# Patient Record
Sex: MALE | Race: OTHER | NOT HISPANIC OR LATINO
[De-identification: names, ages, dates, MRNs, and addresses within clinical notes are randomized per-mention and may not be internally consistent; named-entity substitution may affect disease eponyms.]

---

## 2022-12-19 ENCOUNTER — APPOINTMENT (OUTPATIENT)
Dept: UROLOGY | Facility: CLINIC | Age: 22
End: 2022-12-19

## 2022-12-20 ENCOUNTER — APPOINTMENT (OUTPATIENT)
Dept: UROLOGY | Facility: CLINIC | Age: 22
End: 2022-12-20
Payer: COMMERCIAL

## 2022-12-20 VITALS
SYSTOLIC BLOOD PRESSURE: 113 MMHG | HEART RATE: 81 BPM | BODY MASS INDEX: 22.38 KG/M2 | HEIGHT: 75 IN | DIASTOLIC BLOOD PRESSURE: 67 MMHG | WEIGHT: 180 LBS | TEMPERATURE: 98.5 F

## 2022-12-20 DIAGNOSIS — R39.9 UNSPECIFIED SYMPTOMS AND SIGNS INVOLVING THE GENITOURINARY SYSTEM: ICD-10-CM

## 2022-12-20 PROBLEM — Z00.00 ENCOUNTER FOR PREVENTIVE HEALTH EXAMINATION: Status: ACTIVE | Noted: 2022-12-20

## 2022-12-20 PROCEDURE — 99205 OFFICE O/P NEW HI 60 MIN: CPT

## 2022-12-20 PROCEDURE — 51798 US URINE CAPACITY MEASURE: CPT

## 2022-12-20 NOTE — ASSESSMENT
[FreeTextEntry1] : 22 year old male with history of possible urethritis diagnosed in [place], followed by constellation of non-specific LUTS. Patient reports significant anxiety over this time, tight anus, jaw clenching. Symptoms likely consistent with pelvic floor dysfunction, however, will proceed with additional work-up to r/o other etiologies at this time.\par \par UA\par urine culture\par scrotal US\par renal US\par fu after above to discuss results\par initiate pelvic floor physical therapy --> [facility] referral placed\par

## 2022-12-20 NOTE — PHYSICAL EXAM
[General Appearance - Well Developed] : well developed [General Appearance - Well Nourished] : well nourished [Normal Appearance] : normal appearance [Well Groomed] : well groomed [General Appearance - In No Acute Distress] : no acute distress [Edema] : no peripheral edema [] : no respiratory distress [Respiration, Rhythm And Depth] : normal respiratory rhythm and effort [Exaggerated Use Of Accessory Muscles For Inspiration] : no accessory muscle use [Abdomen Soft] : soft [Abdomen Tenderness] : non-tender [Costovertebral Angle Tenderness] : no ~M costovertebral angle tenderness [Urethral Meatus] : meatus normal [Urinary Bladder Findings] : the bladder was normal on palpation [Scrotum] : the scrotum was normal [Testes Tenderness] : no tenderness of the testes [Testes Mass (___cm)] : there were no testicular masses [No Prostate Nodules] : no prostate nodules [Normal Station and Gait] : the gait and station were normal for the patient's age [Rectal Exam - Rectum] : digital rectal exam was normal [FreeTextEntry1] : + tenderness pelvic floor pressure points

## 2022-12-20 NOTE — HISTORY OF PRESENT ILLNESS
[FreeTextEntry1] : Patient Name: Edwar Rowland\par Date of Birth: [birth year]\par Contact Number:[phone]\par ------------------------------------------------------------------------------\par Date of Initial Visit: 12/20/22\par Referring Provider/PCP: none\par ------------------------------------------------------------------------------\par \par CC: difficulty urinating\par \par \par HPI: This summer, patient was diagnosed with a urethritis in [place]. Patient noticed itchiness at glans and then developed discharge and pain with urinating. He put chlorhexidine through urethra to clean it out. Patient said all STI were negative. Unclear if culture was ultimately positive for anything.  Treated with antibiotics and symptoms resolved. Had renal US 7/27/22 as part of work-up which showed moderate calicopyeloectasia? \par \par About a month later, patient reports felt "awareness of prostate" when aroused. But no pain. The next month, patient developed difficulty urinating. Some hesitancy, once it gets started good flow, but then post-void dribbling. Early November, patient began to notice feeling of clenched anus. Difficult for it to relax. No constipation. Difficult to relax anus.\par \par Patient reports very significant stress over this time. He recently transferred to [facility], changed majors, strife with girlfriend. Feels very lonely. Does not see a therapist/psychiatrist, but is in the process of finding. No fevers or chills. Does report clenching jaw.\par \par Monogamous in relationship, STI testing at beginning of relationship and has since been monogamous. Not interested in repeating.\par \par Has seen multiple prior urologist with no significant findings.\par \par IPSS 28, QOL 5\par GILLIAN 24\par \par PVR 0\par \par PMH: ADHD\par PSH: varicocele surgery 2014\par Family History: no  malignancies\par Social: in a relationship, vapes, smokes marijuana, occasional alcohol\par Meds: none\par Allergies: NKDA\par ROS: as above

## 2022-12-21 LAB
APPEARANCE: CLEAR
BACTERIA: NEGATIVE
BILIRUBIN URINE: NEGATIVE
BLOOD URINE: NEGATIVE
COLOR: YELLOW
GLUCOSE QUALITATIVE U: NEGATIVE
HYALINE CASTS: 0 /LPF
KETONES URINE: NEGATIVE
LEUKOCYTE ESTERASE URINE: NEGATIVE
MICROSCOPIC-UA: NORMAL
NITRITE URINE: NEGATIVE
PH URINE: 6.5
PROTEIN URINE: NORMAL
RED BLOOD CELLS URINE: 2 /HPF
SPECIFIC GRAVITY URINE: 1.03
SQUAMOUS EPITHELIAL CELLS: 0 /HPF
UROBILINOGEN URINE: NORMAL
WHITE BLOOD CELLS URINE: 0 /HPF

## 2022-12-22 LAB — BACTERIA UR CULT: NORMAL

## 2022-12-23 ENCOUNTER — TRANSCRIPTION ENCOUNTER (OUTPATIENT)
Age: 22
End: 2022-12-23

## 2023-01-11 ENCOUNTER — APPOINTMENT (OUTPATIENT)
Dept: UROLOGY | Facility: CLINIC | Age: 23
End: 2023-01-11

## 2023-01-13 ENCOUNTER — APPOINTMENT (OUTPATIENT)
Dept: ULTRASOUND IMAGING | Facility: CLINIC | Age: 23
End: 2023-01-13

## 2023-01-31 ENCOUNTER — NON-APPOINTMENT (OUTPATIENT)
Age: 23
End: 2023-01-31

## 2023-05-15 ENCOUNTER — EMERGENCY (EMERGENCY)
Facility: HOSPITAL | Age: 23
LOS: 1 days | Discharge: HOME | End: 2023-05-15
Attending: STUDENT IN AN ORGANIZED HEALTH CARE EDUCATION/TRAINING PROGRAM | Admitting: STUDENT IN AN ORGANIZED HEALTH CARE EDUCATION/TRAINING PROGRAM
Payer: COMMERCIAL

## 2023-05-15 VITALS
OXYGEN SATURATION: 98 % | TEMPERATURE: 97 F | WEIGHT: 179.9 LBS | RESPIRATION RATE: 18 BRPM | DIASTOLIC BLOOD PRESSURE: 71 MMHG | HEART RATE: 102 BPM | SYSTOLIC BLOOD PRESSURE: 144 MMHG | HEIGHT: 75 IN

## 2023-05-15 PROCEDURE — 99283 EMERGENCY DEPT VISIT LOW MDM: CPT

## 2023-05-15 RX ORDER — EPINEPHRINE 0.3 MG/.3ML
0.3 INJECTION INTRAMUSCULAR; SUBCUTANEOUS
Qty: 1 | Refills: 0
Start: 2023-05-15

## 2023-05-15 NOTE — ED PROVIDER NOTE - PATIENT PORTAL LINK FT
You can access the FollowMyHealth Patient Portal offered by [facility] by registering at the following website: [email or web address]. By joining [employer]’s FollowMyHealth portal, you will also be able to view your health information using other applications (apps) compatible with our system.

## 2023-05-15 NOTE — ED ADULT TRIAGE NOTE - CHIEF COMPLAINT QUOTE
pt. presents s/p allergic reaction, pt. given Decadron 12mg IV, Benadryl 50mg IV and epi pen IM. PTA. Pt. reports feeling better denies any airway involvement, pt. still noted to have hives throughout body.

## 2023-05-15 NOTE — ED PROVIDER NOTE - NSFOLLOWUPINSTRUCTIONS_ED_ALL_ED_FT
Please use epi-pen as needed.    Please follow up with allergy.    Please know that this evaluation is incomplete until you have followed up on today's findings with a primary care or specialist physician.    Thank you and feel better soon.

## 2023-05-15 NOTE — ED PROVIDER NOTE - OBJECTIVE STATEMENT
23 y.o. M now presents s/p allergic reaction. Pt entered apartment and developed SOB, urticarial rash. He has had similar related to exposures in his apartment previously. He received epi/dex/diphenhydramine from EMS. He currently has rash but no other symptoms.

## 2023-05-15 NOTE — ED ADULT NURSE NOTE - NSFALLUNIVINTERV_ED_ALL_ED
Bed/Stretcher in lowest position, wheels locked, appropriate side rails in place/Call bell, personal items and telephone in reach/Instruct patient to call for assistance before getting out of bed/chair/stretcher/Non-slip footwear applied when patient is off stretcher/[place] to call system/Physically safe environment - no spills, clutter or unnecessary equipment/Purposeful proactive rounding/Room/bathroom lighting operational, light cord in reach

## 2023-05-15 NOTE — ED PROVIDER NOTE - PHYSICAL EXAMINATION
VITAL SIGNS: I have reviewed nursing notes and confirm.   CONST: Well-developed; well-nourished; No apparent distress.  ENT: No nasal discharge; airway clear.  HEAD: Normocephalic; atraumatic.  EYES: Sclera clear. Pupils round and symmetrical bilaterally.  CARD: S1, S2 normal; no murmurs, gallops, or rubs. Regular rate and rhythm.  RESP: No wheezes, rales or rhonchi. Breathing comfortably; speaking in full sentences.   ABD: Soft; non-distended; non-tender; no rebound or guarding.  : No CVA tenderness bilaterally.  MSK: No acute deformities noted to extremities. No tenderness to cervical/thoracic/lumbar spine to palpation.  NEURO: Alert, oriented. Speech is fluent and appropriate. Moving all extremities appropriately. No gross motor or sensory abnormalities.   SKIN: +urticarial rash  PSYCH: Cooperative. Appropriate mood, language, and behavior.

## 2023-05-15 NOTE — ED PROVIDER NOTE - CLINICAL SUMMARY MEDICAL DECISION MAKING FREE TEXT BOX
23 y.o. M now presents s/p allergic reaction. Exam as above. Concern for allergic reaction. No need for medication at this juncture. Will observe and d/c to allergy follow up with epi pen.
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
## 2023-05-18 DIAGNOSIS — L50.9 URTICARIA, UNSPECIFIED: ICD-10-CM

## 2023-05-18 DIAGNOSIS — R06.02 SHORTNESS OF BREATH: ICD-10-CM

## 2023-05-18 DIAGNOSIS — T78.40XA ALLERGY, UNSPECIFIED, INITIAL ENCOUNTER: ICD-10-CM
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
## 2023-11-25 ENCOUNTER — EMERGENCY (EMERGENCY)
Facility: HOSPITAL | Age: 23
LOS: 1 days | Discharge: HOME | End: 2023-11-25
Attending: STUDENT IN AN ORGANIZED HEALTH CARE EDUCATION/TRAINING PROGRAM | Admitting: STUDENT IN AN ORGANIZED HEALTH CARE EDUCATION/TRAINING PROGRAM
Payer: SELF-PAY

## 2023-11-25 VITALS
OXYGEN SATURATION: 99 % | SYSTOLIC BLOOD PRESSURE: 131 MMHG | HEART RATE: 67 BPM | TEMPERATURE: 99 F | RESPIRATION RATE: 17 BRPM | DIASTOLIC BLOOD PRESSURE: 68 MMHG

## 2023-11-25 VITALS
WEIGHT: 179.9 LBS | RESPIRATION RATE: 16 BRPM | OXYGEN SATURATION: 99 % | HEIGHT: 75 IN | SYSTOLIC BLOOD PRESSURE: 140 MMHG | DIASTOLIC BLOOD PRESSURE: 85 MMHG | TEMPERATURE: 98 F | HEART RATE: 93 BPM

## 2023-11-25 PROBLEM — Z78.9 OTHER SPECIFIED HEALTH STATUS: Chronic | Status: ACTIVE | Noted: 2023-05-15

## 2023-11-25 PROCEDURE — 73110 X-RAY EXAM OF WRIST: CPT | Mod: 26,LT

## 2023-11-25 PROCEDURE — 25605 CLTX DST RDL FX/EPHYS SEP W/: CPT | Mod: LT

## 2023-11-25 PROCEDURE — 99285 EMERGENCY DEPT VISIT HI MDM: CPT

## 2023-11-25 PROCEDURE — 90715 TDAP VACCINE 7 YRS/> IM: CPT

## 2023-11-25 PROCEDURE — 73090 X-RAY EXAM OF FOREARM: CPT

## 2023-11-25 PROCEDURE — 73110 X-RAY EXAM OF WRIST: CPT

## 2023-11-25 PROCEDURE — 99285 EMERGENCY DEPT VISIT HI MDM: CPT | Mod: 25

## 2023-11-25 PROCEDURE — 90471 IMMUNIZATION ADMIN: CPT

## 2023-11-25 PROCEDURE — 73130 X-RAY EXAM OF HAND: CPT

## 2023-11-25 PROCEDURE — 73090 X-RAY EXAM OF FOREARM: CPT | Mod: 26,LT

## 2023-11-25 PROCEDURE — 73130 X-RAY EXAM OF HAND: CPT | Mod: 26,LT

## 2023-11-25 PROCEDURE — 99053 MED SERV 10PM-8AM 24 HR FAC: CPT

## 2023-11-25 RX ORDER — LIDOCAINE HCL 20 MG/ML
20 VIAL (ML) INJECTION ONCE
Refills: 0 | Status: COMPLETED | OUTPATIENT
Start: 2023-11-25 | End: 2023-11-25

## 2023-11-25 RX ORDER — TETANUS TOXOID, REDUCED DIPHTHERIA TOXOID AND ACELLULAR PERTUSSIS VACCINE, ADSORBED 5; 2.5; 8; 8; 2.5 [IU]/.5ML; [IU]/.5ML; UG/.5ML; UG/.5ML; UG/.5ML
0.5 SUSPENSION INTRAMUSCULAR ONCE
Refills: 0 | Status: COMPLETED | OUTPATIENT
Start: 2023-11-25 | End: 2023-11-25

## 2023-11-25 RX ORDER — OXYCODONE AND ACETAMINOPHEN 5; 325 MG/1; MG/1
1 TABLET ORAL
Qty: 8 | Refills: 0
Start: 2023-11-25 | End: 2023-11-26

## 2023-11-25 RX ADMIN — Medication 20 MILLILITER(S): at 06:00

## 2023-11-25 RX ADMIN — TETANUS TOXOID, REDUCED DIPHTHERIA TOXOID AND ACELLULAR PERTUSSIS VACCINE, ADSORBED 0.5 MILLILITER(S): 5; 2.5; 8; 8; 2.5 SUSPENSION INTRAMUSCULAR at 04:00
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
## 2023-11-25 NOTE — ED ADULT NURSE NOTE - NSFALLUNIVINTERV_ED_ALL_ED
Bed/Stretcher in lowest position, wheels locked, appropriate side rails in place/Call bell, personal items and telephone in reach/Instruct patient to call for assistance before getting out of bed/chair/stretcher/Non-slip footwear applied when patient is off stretcher/[place] to call system/Physically safe environment - no spills, clutter or unnecessary equipment/Purposeful proactive rounding/Room/bathroom lighting operational, light cord in reach

## 2023-11-25 NOTE — ED PROVIDER NOTE - PHYSICAL EXAMINATION
abrasion to right 2nd MCP  abrasion to left mcp knuckles  2+radial  +deformity to left wrist and distal forearm, ttp, no pain to elbow, no pain to shoulder, sensation intact, able to move fingers

## 2023-11-25 NOTE — ED PROVIDER NOTE - PATIENT PORTAL LINK FT
You can access the FollowMyHealth Patient Portal offered by [facility] by registering at the following website: [email or web address]. By joining [employer]’s FollowMyHealth portal, you will also be able to view your health information using other applications (apps) compatible with our system.

## 2023-11-25 NOTE — ED PROVIDER NOTE - CARE PROVIDER_API CALL
Kirill Turner  Orthopaedic Surgery  [address]  [place] [address]  Phone: [phone]  Fax: [phone]  Follow Up Time:
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
## 2023-11-25 NOTE — ED PROVIDER NOTE - PROGRESS NOTE DETAILS
ortho consulted for wrist fracture fracture reduced and splinted by ortho, pt can f/u with Dr. Turner  I have discussed the discharge plan with the patient. The patient agrees with the plan, as discussed.  The patient understands Emergency Department diagnosis is a preliminary diagnosis often based on limited information and that the patient must adhere to the follow-up plan as discussed.  The patient understands that if the symptoms worsen the patient may return to the Emergency Department at any time for further evaluation and treatment.

## 2023-11-25 NOTE — ED PROVIDER NOTE - NSFOLLOWUPINSTRUCTIONS_ED_ALL_ED_FT
Follow-up with orthopedics    Wrist Fracture in Adults    WHAT YOU NEED TO KNOW:    A wrist fracture is a break in one or more of the bones in your wrist.    DISCHARGE INSTRUCTIONS:    Return to the emergency department if:    Your pain gets worse or does not get better after you take pain medicine.    Your cast or splint breaks, gets wet, or is damaged.    Your hand or fingers feel numb or cold.    Your hand or fingers turn white or blue.    Your splint or cast feels too tight.    You have more pain or swelling after the cast or splint is put on.  Call your doctor if:    You have a fever.    There is a foul smell or blood coming from under the cast.    You have questions or concerns about your condition or care.  Medicines: You may need any of the following:    Prescription pain medicine may be given. Ask your healthcare provider how to take this medicine safely. Some prescription pain medicines contain acetaminophen. Do not take other medicines that contain acetaminophen without talking to your healthcare provider. Too much acetaminophen may cause liver damage. Prescription pain medicine may cause constipation. Ask your healthcare provider how to prevent or treat constipation.    NSAIDs, such as ibuprofen, help decrease swelling, pain, and fever. NSAIDs can cause stomach bleeding or kidney problems in certain people. If you take blood thinner medicine, always ask your healthcare provider if NSAIDs are safe for you. Always read the medicine label and follow directions.    Acetaminophen decreases pain and fever. It is available without a doctor's order. Ask how much to take and how often to take it. Follow directions. Read the labels of all other medicines you are using to see if they also contain acetaminophen, or ask your doctor or pharmacist. Acetaminophen can cause liver damage if not taken correctly.    Take your medicine as directed. Contact your healthcare provider if you think your medicine is not helping or if you have side effects. Tell your provider if you are allergic to any medicine. Keep a list of the medicines, vitamins, and herbs you take. Include the amounts, and when and why you take them. Bring the list or the pill bottles to follow-up visits. Carry your medicine list with you in case of an emergency.  Self-care:    Rest as much as possible. Do not play contact sports until the healthcare provider says it is okay.    Apply ice on your wrist for 15 to 20 minutes every hour or as directed. Use an ice pack, or put crushed ice in a plastic bag. Cover it with a towel before you place it on your skin. Ice helps prevent tissue damage and decreases swelling and pain.    Elevate your wrist above the level of your heart as often as possible. This will help decrease swelling and pain. Prop your wrist on pillows or blankets to keep it elevated comfortably.    Cast or splint care:    You may take a bath or shower as directed. Do not let your cast or splint get wet. Before bathing, cover the cast or splint with 2 plastic trash bags. Tape the bags to your skin above the cast or splint to seal out the water. Keep your arm out of the water in case the bag breaks. If a plaster cast gets wet and soft, call your healthcare provider.    Check the skin around the cast or splint every day. You may put lotion on any red or sore areas.    Do not push down or lean on the cast or brace because it may break.    Do not scratch the skin under the cast by putting a sharp or pointed object inside the cast.  Go to physical therapy as directed: You may need physical therapy after your wrist heals and the cast is removed. A physical therapist can teach you exercises to help improve movement and strength and to decrease pain.    Follow up with your doctor or bone specialist as directed: You may need to return to have your cast removed. You may also need an x-ray to check how well the bone has healed. Write down your questions so you remember to ask them during your visits. Follow-up with orthopedics, Dr. Turner    Wrist Fracture in Adults    WHAT YOU NEED TO KNOW:    A wrist fracture is a break in one or more of the bones in your wrist.    DISCHARGE INSTRUCTIONS:    Return to the emergency department if:    Your pain gets worse or does not get better after you take pain medicine.    Your cast or splint breaks, gets wet, or is damaged.    Your hand or fingers feel numb or cold.    Your hand or fingers turn white or blue.    Your splint or cast feels too tight.    You have more pain or swelling after the cast or splint is put on.  Call your doctor if:    You have a fever.    There is a foul smell or blood coming from under the cast.    You have questions or concerns about your condition or care.  Medicines: You may need any of the following:    Prescription pain medicine may be given. Ask your healthcare provider how to take this medicine safely. Some prescription pain medicines contain acetaminophen. Do not take other medicines that contain acetaminophen without talking to your healthcare provider. Too much acetaminophen may cause liver damage. Prescription pain medicine may cause constipation. Ask your healthcare provider how to prevent or treat constipation.    NSAIDs, such as ibuprofen, help decrease swelling, pain, and fever. NSAIDs can cause stomach bleeding or kidney problems in certain people. If you take blood thinner medicine, always ask your healthcare provider if NSAIDs are safe for you. Always read the medicine label and follow directions.    Acetaminophen decreases pain and fever. It is available without a doctor's order. Ask how much to take and how often to take it. Follow directions. Read the labels of all other medicines you are using to see if they also contain acetaminophen, or ask your doctor or pharmacist. Acetaminophen can cause liver damage if not taken correctly.    Take your medicine as directed. Contact your healthcare provider if you think your medicine is not helping or if you have side effects. Tell your provider if you are allergic to any medicine. Keep a list of the medicines, vitamins, and herbs you take. Include the amounts, and when and why you take them. Bring the list or the pill bottles to follow-up visits. Carry your medicine list with you in case of an emergency.  Self-care:    Rest as much as possible. Do not play contact sports until the healthcare provider says it is okay.    Apply ice on your wrist for 15 to 20 minutes every hour or as directed. Use an ice pack, or put crushed ice in a plastic bag. Cover it with a towel before you place it on your skin. Ice helps prevent tissue damage and decreases swelling and pain.    Elevate your wrist above the level of your heart as often as possible. This will help decrease swelling and pain. Prop your wrist on pillows or blankets to keep it elevated comfortably.    Cast or splint care:    You may take a bath or shower as directed. Do not let your cast or splint get wet. Before bathing, cover the cast or splint with 2 plastic trash bags. Tape the bags to your skin above the cast or splint to seal out the water. Keep your arm out of the water in case the bag breaks. If a plaster cast gets wet and soft, call your healthcare provider.    Check the skin around the cast or splint every day. You may put lotion on any red or sore areas.    Do not push down or lean on the cast or brace because it may break.    Do not scratch the skin under the cast by putting a sharp or pointed object inside the cast.  Go to physical therapy as directed: You may need physical therapy after your wrist heals and the cast is removed. A physical therapist can teach you exercises to help improve movement and strength and to decrease pain.    Follow up with your doctor or bone specialist as directed: You may need to return to have your cast removed. You may also need an x-ray to check how well the bone has healed. Write down your questions so you remember to ask them during your visits.
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
## 2023-11-25 NOTE — ED PROVIDER NOTE - ENMT, MLM
Airway patent, Nasal mucosa clear. Mouth with normal mucosa. left lower lip 0.5cm vertical split, no vermillion border involvement

## 2023-11-27 DIAGNOSIS — M79.602 PAIN IN LEFT ARM: ICD-10-CM

## 2023-11-27 DIAGNOSIS — Z20.822 CONTACT WITH AND (SUSPECTED) EXPOSURE TO COVID-19: ICD-10-CM

## 2023-11-27 DIAGNOSIS — F17.200 NICOTINE DEPENDENCE, UNSPECIFIED, UNCOMPLICATED: ICD-10-CM

## 2023-11-27 DIAGNOSIS — S60.512A ABRASION OF LEFT HAND, INITIAL ENCOUNTER: ICD-10-CM

## 2023-11-27 DIAGNOSIS — S52.502A UNSPECIFIED FRACTURE OF THE LOWER END OF LEFT RADIUS, INITIAL ENCOUNTER FOR CLOSED FRACTURE: ICD-10-CM

## 2023-11-27 DIAGNOSIS — S01.511A LACERATION WITHOUT FOREIGN BODY OF LIP, INITIAL ENCOUNTER: ICD-10-CM
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
## 2023-11-27 DIAGNOSIS — S52.602A UNSPECIFIED FRACTURE OF LOWER END OF LEFT ULNA, INITIAL ENCOUNTER FOR CLOSED FRACTURE: ICD-10-CM

## 2023-11-27 DIAGNOSIS — Z23 ENCOUNTER FOR IMMUNIZATION: ICD-10-CM
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
## 2023-11-30 ENCOUNTER — APPOINTMENT (OUTPATIENT)
Age: 23
End: 2023-11-30
Payer: SELF-PAY

## 2023-11-30 PROCEDURE — 73110 X-RAY EXAM OF WRIST: CPT | Mod: 50

## 2023-11-30 PROCEDURE — 99204 OFFICE O/P NEW MOD 45 MIN: CPT

## 2023-12-07 ENCOUNTER — APPOINTMENT (OUTPATIENT)
Dept: ORTHOPEDIC SURGERY | Facility: CLINIC | Age: 23
End: 2023-12-07
Payer: SELF-PAY

## 2023-12-07 PROCEDURE — 99214 OFFICE O/P EST MOD 30 MIN: CPT

## 2023-12-07 PROCEDURE — 73110 X-RAY EXAM OF WRIST: CPT | Mod: LT

## 2023-12-13 ENCOUNTER — APPOINTMENT (OUTPATIENT)
Dept: ORTHOPEDIC SURGERY | Facility: CLINIC | Age: 23
End: 2023-12-13
Payer: SELF-PAY

## 2023-12-13 DIAGNOSIS — S59.902D UNSPECIFIED INJURY OF LEFT ELBOW, SUBSEQUENT ENCOUNTER: ICD-10-CM

## 2023-12-13 PROCEDURE — 73110 X-RAY EXAM OF WRIST: CPT | Mod: LT

## 2023-12-13 PROCEDURE — 99213 OFFICE O/P EST LOW 20 MIN: CPT

## 2023-12-21 ENCOUNTER — APPOINTMENT (OUTPATIENT)
Dept: ORTHOPEDIC SURGERY | Facility: CLINIC | Age: 23
End: 2023-12-21
Payer: SELF-PAY

## 2023-12-21 PROCEDURE — 99214 OFFICE O/P EST MOD 30 MIN: CPT

## 2023-12-21 PROCEDURE — 73110 X-RAY EXAM OF WRIST: CPT | Mod: LT

## 2023-12-21 PROCEDURE — 29085 APPL CAST HAND&LWR FOREARM: CPT

## 2023-12-21 NOTE — PHYSICAL EXAM
[de-identified] : Left sugartong splint is in place.  The splint has loosened and is unraveling.  It was removed today.  Mild tenderness over the left distal radius.  Mild left wrist deformity noted.  Near symmetric left hand digital ROM. Sensation intact to light touch globally at the left hand. Well perfused digits. No appreciation of skin breakdown or irritation around the edges of the splint. [de-identified] : In-splint PA, oblique, and lateral x-rays of the left wrist were obtained today to assess the fracture.  The intra-articular distal radius fracture exhibits mild intra-articular displacement relative to last visits x-rays, there seems to be 1 mm increase in articular step-off.

## 2023-12-21 NOTE — ASSESSMENT
[FreeTextEntry1] : I had a lengthy discussion with the patient today regarding his nonoperatively treated left intra-articular distal radius fracture.  I explained the mild interval displacement noted on x-ray today.  Both continued nonoperative and operative treatment options discussed along with the advantages and disabilities of each.  Shared decision making was made to proceed with continued nonoperative treatment with full-time immobilization in a short arm cast.  I explained the possibility of increasing fracture displacement which may warrant surgery.  I emphasized close monitoring the patient will plan to follow back up with me the first week of January when he returns back from his trip to [place].  I emphasized cast care precautions and operating any load or weight through the wrist.  He was in accordance with the plan.

## 2023-12-21 NOTE — HISTORY OF PRESENT ILLNESS
[FreeTextEntry1] : Date of injury: November 25, 2023 (3 weeks, 5 days)  The patient is returning for follow-up of his left distal radius fracture that was closed reduced and splinted.

## 2024-01-09 ENCOUNTER — APPOINTMENT (OUTPATIENT)
Dept: ORTHOPEDIC SURGERY | Facility: CLINIC | Age: 24
End: 2024-01-09
Payer: SELF-PAY

## 2024-01-09 DIAGNOSIS — S69.92XA UNSPECIFIED INJURY OF LEFT WRIST, HAND AND FINGER(S), INITIAL ENCOUNTER: ICD-10-CM

## 2024-01-09 PROCEDURE — 73110 X-RAY EXAM OF WRIST: CPT | Mod: RT

## 2024-01-09 PROCEDURE — 99213 OFFICE O/P EST LOW 20 MIN: CPT

## 2024-04-16 ENCOUNTER — EMERGENCY (EMERGENCY)
Facility: HOSPITAL | Age: 24
LOS: 1 days | Discharge: HOME | End: 2024-04-16
Admitting: EMERGENCY MEDICINE
Payer: COMMERCIAL

## 2024-04-16 VITALS
WEIGHT: 184.97 LBS | OXYGEN SATURATION: 98 % | SYSTOLIC BLOOD PRESSURE: 117 MMHG | HEIGHT: 75 IN | RESPIRATION RATE: 16 BRPM | HEART RATE: 73 BPM | TEMPERATURE: 97 F | DIASTOLIC BLOOD PRESSURE: 77 MMHG

## 2024-04-16 PROCEDURE — 99284 EMERGENCY DEPT VISIT MOD MDM: CPT

## 2024-04-16 RX ORDER — EPINEPHRINE 0.3 MG/.3ML
0.3 INJECTION INTRAMUSCULAR; SUBCUTANEOUS ONCE
Refills: 0 | Status: COMPLETED | OUTPATIENT
Start: 2024-04-16 | End: 2024-04-16

## 2024-04-16 RX ADMIN — EPINEPHRINE 0.3 MILLIGRAM(S): 0.3 INJECTION INTRAMUSCULAR; SUBCUTANEOUS at 23:11

## 2024-04-16 NOTE — ED PROVIDER NOTE - OBJECTIVE STATEMENT
22 yo m pmhx sig for allergic reaction of unclear etiology here with acute onset of sob with lip swelling, facial flushing and voice changes after returning home from a bar where he had 2 etoh drinks, pt has been able to tolerate ETOH in the past. Does not recall any other new food intake. Received benadryl and decadron pta, symptoms have improved and pt reports that he feels much better.    I have reviewed available current nursing and previous documentation of past medical, surgical, family, and/or social history.

## 2024-04-16 NOTE — ED PROVIDER NOTE - PHYSICAL EXAMINATION
Physical Exam    Vital Signs: I have reviewed the initial vital signs.  Constitutional: well-appearing, appears stated age  Eyes: PERRLA, EOM intact, RAPD absent, and symmetrical lids.  ENT: neck supple with no adenopathy, moist MM, no airway edema, midline uvula and tolerating oral secretions  Cardiovascular: +S1/S2, no murmurs, regular rate, regular rhythm, well-perfused extremities  Respiratory: unlabored respiratory effort, clear to auscultation bilaterally, speaks in full sentences  Gastrointestinal: soft, non-tender abdomen, no pulsatile mass  Integumentary: no rash or edema

## 2024-04-16 NOTE — ED PROVIDER NOTE - PROGRESS NOTE DETAILS
well appearing here with allergic reaction reports that he feels much better and left the ed w/o discharge instructions after he received an epi pen to take home

## 2024-04-16 NOTE — ED ADULT TRIAGE NOTE - CHIEF COMPLAINT QUOTE
Pt brought in by EMS with complaint of an allergic reaction with symptoms including nasal congestion, generalized body hives, itching to the hands and lips. Pt denies any throat itching or swelling. Denies any difficulty breathing. Pt given IV push benadryl 50 mg and 12 mg IVP decadron.

## 2024-04-16 NOTE — ED PROVIDER NOTE - PATIENT PORTAL LINK FT
You can access the FollowMyHealth Patient Portal offered by [facility] by registering at the following website: [email or web address]. By joining [employer]’s FollowMyHealth portal, you will also be able to view your health information using other applications (apps) compatible with our system.

## 2024-04-16 NOTE — ED ADULT NURSE NOTE - CHPI ED NUR SYMPTOMS NEG
no difficulty breathing/no difficulty swallowing/no nausea/no shortness of breath/no swelling of face, tongue/no throat itching/no wheezing

## 2024-04-16 NOTE — ED PROVIDER NOTE - CLINICAL SUMMARY MEDICAL DECISION MAKING FREE TEXT BOX
pt here with an allergic reaction after unclear precipitating factor with urticarial rash, voice changes and sob at home that resolved since receiving decadron and benadryl

## 2024-04-18 DIAGNOSIS — T78.40XA ALLERGY, UNSPECIFIED, INITIAL ENCOUNTER: ICD-10-CM
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
## 2024-04-18 DIAGNOSIS — R06.02 SHORTNESS OF BREATH: ICD-10-CM
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
